# Patient Record
Sex: MALE | Race: WHITE | NOT HISPANIC OR LATINO | Employment: FULL TIME | URBAN - METROPOLITAN AREA
[De-identification: names, ages, dates, MRNs, and addresses within clinical notes are randomized per-mention and may not be internally consistent; named-entity substitution may affect disease eponyms.]

---

## 2020-07-22 ENCOUNTER — OFFICE VISIT - HEALTHEAST (OUTPATIENT)
Dept: FAMILY MEDICINE | Facility: CLINIC | Age: 50
End: 2020-07-22

## 2020-07-22 DIAGNOSIS — Z00.00 ROUTINE GENERAL MEDICAL EXAMINATION AT A HEALTH CARE FACILITY: ICD-10-CM

## 2020-07-22 DIAGNOSIS — E66.811 CLASS 1 OBESITY DUE TO EXCESS CALORIES WITHOUT SERIOUS COMORBIDITY WITH BODY MASS INDEX (BMI) OF 30.0 TO 30.9 IN ADULT: ICD-10-CM

## 2020-07-22 DIAGNOSIS — Z12.11 COLON CANCER SCREENING: ICD-10-CM

## 2020-07-22 DIAGNOSIS — D22.9 BENIGN MOLE: ICD-10-CM

## 2020-07-22 DIAGNOSIS — R03.0 ELEVATED BLOOD PRESSURE READING WITHOUT DIAGNOSIS OF HYPERTENSION: ICD-10-CM

## 2020-07-22 DIAGNOSIS — E66.09 CLASS 1 OBESITY DUE TO EXCESS CALORIES WITHOUT SERIOUS COMORBIDITY WITH BODY MASS INDEX (BMI) OF 30.0 TO 30.9 IN ADULT: ICD-10-CM

## 2020-07-22 ASSESSMENT — MIFFLIN-ST. JEOR: SCORE: 1770.72

## 2020-07-24 ENCOUNTER — AMBULATORY - HEALTHEAST (OUTPATIENT)
Dept: LAB | Facility: CLINIC | Age: 50
End: 2020-07-24

## 2020-07-24 DIAGNOSIS — R03.0 ELEVATED BLOOD PRESSURE READING WITHOUT DIAGNOSIS OF HYPERTENSION: ICD-10-CM

## 2020-07-24 DIAGNOSIS — E66.09 CLASS 1 OBESITY DUE TO EXCESS CALORIES WITHOUT SERIOUS COMORBIDITY WITH BODY MASS INDEX (BMI) OF 30.0 TO 30.9 IN ADULT: ICD-10-CM

## 2020-07-24 DIAGNOSIS — E66.811 CLASS 1 OBESITY DUE TO EXCESS CALORIES WITHOUT SERIOUS COMORBIDITY WITH BODY MASS INDEX (BMI) OF 30.0 TO 30.9 IN ADULT: ICD-10-CM

## 2020-07-24 LAB
ANION GAP SERPL CALCULATED.3IONS-SCNC: 12 MMOL/L (ref 5–18)
BUN SERPL-MCNC: 14 MG/DL (ref 8–22)
CALCIUM SERPL-MCNC: 9.3 MG/DL (ref 8.5–10.5)
CHLORIDE BLD-SCNC: 104 MMOL/L (ref 98–107)
CHOLEST SERPL-MCNC: 199 MG/DL
CO2 SERPL-SCNC: 26 MMOL/L (ref 22–31)
CREAT SERPL-MCNC: 1 MG/DL (ref 0.7–1.3)
ERYTHROCYTE [DISTWIDTH] IN BLOOD BY AUTOMATED COUNT: 12.5 % (ref 11–14.5)
FASTING STATUS PATIENT QL REPORTED: YES
GFR SERPL CREATININE-BSD FRML MDRD: >60 ML/MIN/1.73M2
GLUCOSE BLD-MCNC: 101 MG/DL (ref 70–125)
HBA1C MFR BLD: 5.3 % (ref 3.5–6)
HCT VFR BLD AUTO: 48.3 % (ref 40–54)
HDLC SERPL-MCNC: 49 MG/DL
HGB BLD-MCNC: 15.5 G/DL (ref 14–18)
LDLC SERPL CALC-MCNC: 123 MG/DL
MCH RBC QN AUTO: 29.4 PG (ref 27–34)
MCHC RBC AUTO-ENTMCNC: 32.1 G/DL (ref 32–36)
MCV RBC AUTO: 92 FL (ref 80–100)
PLATELET # BLD AUTO: 279 THOU/UL (ref 140–440)
PMV BLD AUTO: 8.6 FL (ref 8.5–12.5)
POTASSIUM BLD-SCNC: 4.4 MMOL/L (ref 3.5–5)
RBC # BLD AUTO: 5.28 MILL/UL (ref 4.4–6.2)
SODIUM SERPL-SCNC: 142 MMOL/L (ref 136–145)
TRIGL SERPL-MCNC: 136 MG/DL
TSH SERPL DL<=0.005 MIU/L-ACNC: 1.45 UIU/ML (ref 0.3–5)
WBC: 7.9 THOU/UL (ref 4–11)

## 2020-07-26 ENCOUNTER — COMMUNICATION - HEALTHEAST (OUTPATIENT)
Dept: FAMILY MEDICINE | Facility: CLINIC | Age: 50
End: 2020-07-26

## 2020-08-12 ENCOUNTER — RECORDS - HEALTHEAST (OUTPATIENT)
Dept: ADMINISTRATIVE | Facility: OTHER | Age: 50
End: 2020-08-12

## 2021-05-13 ENCOUNTER — OFFICE VISIT - HEALTHEAST (OUTPATIENT)
Dept: FAMILY MEDICINE | Facility: CLINIC | Age: 51
End: 2021-05-13

## 2021-05-13 ENCOUNTER — COMMUNICATION - HEALTHEAST (OUTPATIENT)
Dept: TELEHEALTH | Facility: CLINIC | Age: 51
End: 2021-05-13

## 2021-05-13 DIAGNOSIS — E66.09 CLASS 1 OBESITY DUE TO EXCESS CALORIES WITHOUT SERIOUS COMORBIDITY WITH BODY MASS INDEX (BMI) OF 30.0 TO 30.9 IN ADULT: ICD-10-CM

## 2021-05-13 DIAGNOSIS — D12.6 SERRATED ADENOMA OF COLON: ICD-10-CM

## 2021-05-13 DIAGNOSIS — Z23 ENCOUNTER FOR IMMUNIZATION: ICD-10-CM

## 2021-05-13 DIAGNOSIS — E66.811 CLASS 1 OBESITY DUE TO EXCESS CALORIES WITHOUT SERIOUS COMORBIDITY WITH BODY MASS INDEX (BMI) OF 30.0 TO 30.9 IN ADULT: ICD-10-CM

## 2021-05-13 DIAGNOSIS — R03.0 ELEVATED BLOOD PRESSURE READING WITHOUT DIAGNOSIS OF HYPERTENSION: ICD-10-CM

## 2021-05-13 DIAGNOSIS — M79.672 LEFT FOOT PAIN: ICD-10-CM

## 2021-05-17 ENCOUNTER — OFFICE VISIT - HEALTHEAST (OUTPATIENT)
Dept: PODIATRY | Facility: CLINIC | Age: 51
End: 2021-05-17

## 2021-05-17 DIAGNOSIS — G58.8 NEUROMA DIGITAL NERVE: ICD-10-CM

## 2021-05-17 ASSESSMENT — MIFFLIN-ST. JEOR: SCORE: 1813.8

## 2021-05-27 VITALS — BODY MASS INDEX: 30.06 KG/M2 | HEIGHT: 70 IN | OXYGEN SATURATION: 97 % | HEART RATE: 80 BPM | WEIGHT: 210 LBS

## 2021-05-27 VITALS
DIASTOLIC BLOOD PRESSURE: 88 MMHG | SYSTOLIC BLOOD PRESSURE: 160 MMHG | OXYGEN SATURATION: 98 % | HEART RATE: 82 BPM | WEIGHT: 216 LBS

## 2021-05-29 ENCOUNTER — HEALTH MAINTENANCE LETTER (OUTPATIENT)
Age: 51
End: 2021-05-29

## 2021-06-04 VITALS
SYSTOLIC BLOOD PRESSURE: 150 MMHG | BODY MASS INDEX: 30.21 KG/M2 | WEIGHT: 204 LBS | HEIGHT: 69 IN | DIASTOLIC BLOOD PRESSURE: 88 MMHG | OXYGEN SATURATION: 100 % | HEART RATE: 79 BPM

## 2021-06-09 NOTE — PROGRESS NOTES
Assessment/Plan:     1. Routine general medical examination at a health care facility  Routine healthcare maintenance.  Preventative cares reviewed.  Patient establishing care in this office.  Adacel booster provided.  Shingrix immunization provided and will require booster in 2 to 6 months.  Annual physical exams to continue.  - Tdap vaccine greater than or equal to 6yo IM  - Varicella Zoster, Recombinant Vaccine IM    2. Class 1 obesity due to excess calories without serious comorbidity with body mass index (BMI) of 30.0 to 30.9 in adult  Dietary and exercise modification reviewed for weight goal less than 190 pounds initially, less than 185 pounds ideally.  Diabetes screen and lipid cascade to be obtained.  - Lipid Cascade; Future  - Glycosylated Hemoglobin A1c; Future    3. Elevated blood pressure reading without diagnosis of hypertension  Elevated blood pressure noted 150/88 on recheck and will check base metabolic panel, CBC and TSH.  Weight goal as noted above with blood pressure recheck in office in 6 months to ensure less than 130/80 ideally.  Monitor blood pressures outside of clinic.  - Basic Metabolic Panel; Future  - Thyroid Stimulating Hormone (TSH); Future  - HM2(CBC w/o Differential); Future    4. Colon cancer screening  Screening colonoscopy to be completed at earliest convenience.  No significant risk factors identified.  - Ambulatory referral for Colonoscopy       I have had an Advance Directives discussion with the patient.  The following high BMI interventions were performed this visit: encouragement to exercise, weight monitoring, weight loss from baseline weight and lifestyle education regarding diet.  Ensure ongoing efforts to achieve weight goal < 190 pounds initially, < 185 pounds ideally.              Subjective:      Bernard Norman is a 49 y.o. male who presents for an annual exam.  Establishing care.  Has gained weight with current pandemic.  Less active.  Occasional elevated blood  pressure without history of hypertension.  Past medical social and family history reviewed as noted below with family history of heart disease in patient's mother.  Father also with diabetes apparently.  Patient has not had colonoscopy.  Does not know when he had prior immunizations however feels that it was over 10 years ago.  Denies recent illness.  Comprehensive review of systems as above otherwise all negative.     - Indy bingham   2 sons - Wilfredo (17) and Percy (15)  Tobacco:  never  EtOH:  occ beer or whiskey  Mom -  65 heart disease  Dad -  58 suicide with h/o depression; h/o diabetes  2 older bros -   Surgeries:  Lasik (uses reading glasses now)  Hospitalizations:  none  Work:  engineering at chemical plants  Hobbies:  broomball, board games (Pandemic)    Healthy Habits:   Regular Exercise: Yes  Healthy Diet: Yes  Dental Visits Regularly: Yes  Seat Belt: Yes   Sexually active: Yes  Colonoscopy: due (will schedule)  Lipid Profile: Yes  Glucose Screen: Yes    There is no immunization history for the selected administration types on file for this patient.  Immunization status: agrees to Tdap booster as well as starting Shingrix series.  Vision Screening:both eyes (had Lasik eye surgery)  Hearing: PASS     No current outpatient medications on file.     No current facility-administered medications for this visit.      History reviewed. No pertinent past medical history.  History reviewed. No pertinent surgical history.  Patient has no known allergies.  History reviewed. No pertinent family history.  Social History     Socioeconomic History     Marital status:      Spouse name: Not on file     Number of children: Not on file     Years of education: Not on file     Highest education level: Not on file   Occupational History     Not on file   Social Needs     Financial resource strain: Not on file     Food insecurity     Worry: Not on file     Inability: Not on file     Transportation  "needs     Medical: Not on file     Non-medical: Not on file   Tobacco Use     Smoking status: Never Smoker     Smokeless tobacco: Never Used   Substance and Sexual Activity     Alcohol use: Not on file     Drug use: Not on file     Sexual activity: Not on file   Lifestyle     Physical activity     Days per week: Not on file     Minutes per session: Not on file     Stress: Not on file   Relationships     Social connections     Talks on phone: Not on file     Gets together: Not on file     Attends Islam service: Not on file     Active member of club or organization: Not on file     Attends meetings of clubs or organizations: Not on file     Relationship status: Not on file     Intimate partner violence     Fear of current or ex partner: Not on file     Emotionally abused: Not on file     Physically abused: Not on file     Forced sexual activity: Not on file   Other Topics Concern     Not on file   Social History Narrative     Not on file       Review of Systems  Comprehensive ROS: as above, otherwise all negative.           Objective:     /88   Pulse 79   Ht 5' 9\" (1.753 m)   Wt 204 lb (92.5 kg)   SpO2 100%   BMI 30.13 kg/m    Body mass index is 30.13 kg/m .    Physical    General Appearance:    Alert, cooperative, no distress, appears stated age.  BMI 30.13   Head:    Normocephalic, without obvious abnormality, atraumatic   Eyes:    PERRL, conjunctiva/corneas clear, EOM's intact, fundi     benign, both eyes        Ears:    Normal TM's and external ear canals, both ears   Nose:   Nares normal, septum midline, mucosa normal, no drainage    or sinus tenderness   Throat:   Lips, mucosa, and tongue normal; teeth and gums normal   Neck:   Supple, symmetrical, trachea midline, no adenopathy;        thyroid:  No enlargement/tenderness/nodules; no carotid    bruit or JVD   Back:     Symmetric, no curvature, ROM normal, no CVA tenderness   Lungs:     Clear to auscultation bilaterally, respirations unlabored "   Chest wall:    No tenderness or deformity   Heart:    Regular rate and rhythm, S1 and S2 normal, no murmur, rub   or gallop   Abdomen:     Soft, non-tender, bowel sounds active all four quadrants,     no masses, no organomegaly.     Genitalia:    Normal male without lesion, discharge or tenderness.  No inguinal hernia noted.     Rectal:    Normal tone.  Prostate normal/symmetric, no masses or tenderness.   Extremities:   Extremities normal, atraumatic, no cyanosis or edema   Pulses:   2+ and symmetric all extremities   Skin:   Skin color, texture, turgor normal, no rashes.  Mole noted between second and third toe webspace appearing benign.  Describes unchanged in past 20 years.   Lymph nodes:   Cervical, supraclavicular, and axillary nodes normal   Neurologic:   CNII-XII intact. Normal strength, sensation and reflexes       throughout                This note has been dictated using voice recognition software and as a result may contain minor grammatical errors and unintended word substitutions.

## 2021-06-16 PROBLEM — D12.6 SERRATED ADENOMA OF COLON: Status: ACTIVE | Noted: 2021-05-13

## 2021-06-16 PROBLEM — R03.0 ELEVATED BLOOD PRESSURE READING WITHOUT DIAGNOSIS OF HYPERTENSION: Status: ACTIVE | Noted: 2020-07-22

## 2021-06-16 PROBLEM — D22.9 BENIGN MOLE: Status: ACTIVE | Noted: 2020-07-22

## 2021-06-16 PROBLEM — E66.09 CLASS 1 OBESITY DUE TO EXCESS CALORIES WITHOUT SERIOUS COMORBIDITY WITH BODY MASS INDEX (BMI) OF 30.0 TO 30.9 IN ADULT: Status: ACTIVE | Noted: 2020-07-22

## 2021-06-16 PROBLEM — E66.811 CLASS 1 OBESITY DUE TO EXCESS CALORIES WITHOUT SERIOUS COMORBIDITY WITH BODY MASS INDEX (BMI) OF 30.0 TO 30.9 IN ADULT: Status: ACTIVE | Noted: 2020-07-22

## 2021-06-17 NOTE — PATIENT INSTRUCTIONS - HE
Please call one of the [place] locations below to schedule an appointment. If you received a prescription please bring it with you to your appointment. Some locations are limited to what they carry.    Office Locations    [facility]  [address] [place] [address]  Home Medical Equipment, [address]   Phone: [phone]   Orthotics and Prosthetics, [address]   Phone: [phone]    [facility]  Home Medical Equipment  [address], [place] [address]   Phone: [phone]    Orthotics and Prosthetics ([facility])    [address], [address], [place] [address]  Phone: [phone]    [facility]  [address]   [place] [address]   Phone: [phone]    [facility]ld[facility]  [address]  [place] [address]  Phone: [phone]    [facility]   [address]  [place] [address]  Phone: [phone]    [facility]  [address]  [place] [address]  Phone: [phone]    [facility]  [address]  [address]  [place] [address]  Phone: [phone]    [place]   [address] [place] [address]   Phone: [phone]      What are Prescription Custom Orthotics?  Custom orthotics are specially-made devices designed to support and comfort your feet. Prescription orthotics are crafted for you and no one else. They match the contours of your feet precisely and are designed for the way you move. Orthotics are only manufactured after a podiatrist has conducted a complete evaluation of your feet, ankles, and legs, so the orthotic can accommodate your unique foot structure and pathology.  Prescription orthotics are divided into two  categories:    Functional orthotics are designed to control abnormal motion. They may be used to treat foot pain caused by abnormal motion; they can also be used to treat injuries such as shin splints or tendinitis. Functional orthotics are usually crafted of a semi-rigid material such as plastic or graphite.    Accommodative orthotics are softer and meant to provide additional cushioning and support. They can be used to treat diabetic foot ulcers, painful calluses on the bottom of the foot, and other uncomfortable conditions.  Podiatrists use orthotics to treat foot problems such as plantar fasciitis, bursitis, tendinitis, diabetic foot ulcers, and foot, ankle, and heel pain. Clinical research studies have shown that podiatrist-prescribed foot orthotics decrease foot pain and improve function.  Orthotics typically cost more than shoe inserts purchased in a retail store, but the additional cost is usually well worth it. Unlike shoe inserts, orthotics are molded to fit each individual foot, so you can be sure that your orthotics fit and do what they're supposed to do. Prescription orthotics are also made of top-notch materials and last many years when cared for properly. Insurance often helps pay for prescription orthotics.  What are Shoe Inserts?   You've seen them at the grocery store and at the mall. You've probably even seen them on TV and online. Shoe inserts are any kind of non-prescription foot support designed to be worn inside a shoe. Pre-packaged, mass produced, arch supports are shoe inserts. So are the  custom-made  insoles and foot supports that you can order online or at retail stores. Unless the device has been prescribed by a doctor and crafted for your specific foot, it's a shoe insert, not a custom orthotic device--despite what the ads might say.  Shoe inserts can be very helpful for a variety of foot ailments, including flat arches and foot and leg pain. They can cushion your feet, provide comfort,  and support your arches, but they can't correct biomechanical foot problems or cure long-standing foot issues.  The most common types of shoe inserts are:    Arch supports: Some people have high arches. Others have low arches or flat feet. Arch supports generally have a  bumped-up  appearance and are designed to support the foot's natural arch.     Insoles: Insoles slip into your shoe to provide extra cushioning and support. Insoles are often made of gel, foam, or plastic.     Heel liners: Heel liners, sometimes called heel pads or heel cups, provide extra cushioning in the heel region. They may be especially useful for patients who have foot pain caused by age-related thinning of the heels' natural fat pads.     Foot cushions: Do your shoes rub against your heel or your toes? Foot cushions come in many different shapes and sizes and can be used as a barrier between you and your shoe.  Choosing an Over-the-Counter Shoe Insert  Selecting a shoe insert from the wide variety of devices on the market can be overwhelming. Here are some podiatrist-tested tips to help you find the insert that best meets your needs:    Consider your health. Do you have diabetes? Problems with circulation? An over-the-counter insert may not be your best bet. Diabetes and poor circulation increase your risk of foot ulcers and infections, so schedule an appointment with a podiatrist. He or she can help you select a solution that won't cause additional health problems.     Think about the purpose. Are you planning to run a marathon, or do you just need a little arch support in your work shoes? Look for a product that fits your planned level of activity.     Bring your shoes. For the insert to be effective, it has to fit into your shoes. So bring your sneakers, dress shoes, or work boots--whatever you plan to wear with your insert. Look for an insert that will fit the contours of your shoe.     Try them on. If all possible, slip the insert into  your shoe and try it out. Walk around a little. How does it feel? Don't assume that feelings of pressure will go away with continued wear. (If you can't try the inserts at the store, ask about the store's return policy and hold on to your receipt.)

## 2021-06-17 NOTE — PROGRESS NOTES
Assessment/Plan:    1. Elevated blood pressure reading without diagnosis of hypertension  Elevated blood pressure noted.  Blood pressure 144/90 on recheck.  We will continue to monitor outside of office for goal less than 130/80 ideally.  Weight goal less than 200 pounds initially, less than 190 pounds ideally with anticipated reassessment at physical exam 2021.    2. Class 1 obesity due to excess calories without serious comorbidity with body mass index (BMI) of 30.0 to 30.9 in adult  Weight goal less than 200 pounds initially, less than 190 pounds ideally with anticipated reassessment at physical exam 2021.    3. Serrated adenoma of colon  Serrated adenoma x2 with tubular adenoma on recent colonoscopy 2020 and will repeat at 3-year interval.    4. Encounter for immunization  Shingrix booster provided today.  - Varicella Zoster, Recombinant Vaccine IM    5. Left foot pain  Left foot pain reviewed with positive Estrada's sign with question of Martínez's neuroma and did refer patient to see podiatrist for further treatment options.  - Ambulatory referral to Podiatry - [facility] (includes [facility] groups)          Subjective:    Bernard Norman is seen today for elevated blood pressure follow-up.  Patient had blood pressure elevation at physical exam 2020 with blood pressure 150/88 at that time.  Not checking blood pressures outside of office.  Patient's mother  age 65 secondary to heart disease.  Patient did have colonoscopy 2020 with serrated adenoma x2 along with tubular adenoma and did have recommendation for 3-year follow-up.  Needs Shingrix booster.  Has not had COVID-19.  Did have COVID-19 vaccine series.  Left foot pain also.  Wondering if he overdid it.  Does a lot of mountain biking etc.  Heavy activity will cause pain.  Noticed some separation between second and third toes of left foot.  No history of Martínez's neuroma.  No right foot  concerns.  Does have some numbness perhaps into second or third toe of foot as well at times, intermittent.  Comprehensive review of systems as above otherwise all negative.     - Indy bingham 1994   2 sons - Wilfredo (17) and Percy (15)   Tobacco:  never   EtOH:  occ beer or whiskey   Mom -  65 heart disease   Dad -  58 suicide with h/o depression; h/o diabetes   2 older bros -   Surgeries:  Lasik (uses reading glasses now)   Hospitalizations:  none   Work:  engineering at chemical plants   Hobbies:  broomball, board games (Pandemic)    History reviewed. No pertinent surgical history.     History reviewed. No pertinent family history.     History reviewed. No pertinent past medical history.     Social History     Tobacco Use     Smoking status: Never Smoker     Smokeless tobacco: Never Used   Substance Use Topics     Alcohol use: Not on file     Drug use: Not on file        No current outpatient medications on file.     No current facility-administered medications for this visit.           Objective:    Vitals:    21 1534   BP: 160/88   Pulse: 82   SpO2: 98%   Weight: 216 lb (98 kg)      Body mass index is 31.9 kg/m .    Alert.  No apparent distress.  Blood pressure on recheck 144/90 with BMI 31.9.  Musculoskeletal exam with some splaying of toes between second and third left foot with tenderness in interspace at metatarsal head.      This note has been dictated using voice recognition software and as a result may contain minor grammatical errors and unintended word substitutions.

## 2021-06-17 NOTE — PROGRESS NOTES
FOOT AND ANKLE SURGERY/PODIATRY CONSULT NOTE         ASSESSMENT:   Neuroma second intermetatarsal space left foot      TREATMENT:  I have recommended orthotics.  The patient is to return to the clinic if his pain progresses.        HPI: I was asked to see Bernard Norman today complaining of some numbness and some mild pain between the second and third toes of the left foot.  The patient stated that today his symptoms are pretty mild.  He also notices numbness between the first and second toes of his left foot.  He denies any trauma to his foot.  He has not had any redness, swelling, drainage or bleeding.  He denies any previous treatment.  The pain is nonradiating.  The patient was seen in consultation at the request of Jamie Fabian MD for evaluation and treatment of left foot pain.     No past medical history on file.    No past surgical history on file.    No Known Allergies    No current outpatient medications on file.    No family history on file.    Social History     Socioeconomic History     Marital status:      Spouse name: Not on file     Number of children: Not on file     Years of education: Not on file     Highest education level: Not on file   Occupational History     Not on file   Social Needs     Financial resource strain: Not on file     Food insecurity     Worry: Not on file     Inability: Not on file     Transportation needs     Medical: Not on file     Non-medical: Not on file   Tobacco Use     Smoking status: Never Smoker     Smokeless tobacco: Never Used   Substance and Sexual Activity     Alcohol use: Not on file     Drug use: Not on file     Sexual activity: Not on file   Lifestyle     Physical activity     Days per week: Not on file     Minutes per session: Not on file     Stress: Not on file   Relationships     Social connections     Talks on phone: Not on file     Gets together: Not on file     Attends Hoahaoism service: Not on file     Active member of club or organization: Not on  file     Attends meetings of clubs or organizations: Not on file     Relationship status: Not on file     Intimate partner violence     Fear of current or ex partner: Not on file     Emotionally abused: Not on file     Physically abused: Not on file     Forced sexual activity: Not on file   Other Topics Concern     Not on file   Social History Narrative     Not on file       Review of Systems - Patient denies fever, chills, rash, wound, stiffness, limping, numbness, weakness, heart burn, blood in stool, chest pain with activity, calf pain when walking, shortness of breath with activity, chronic cough, easy bleeding/bruising, swelling of ankles, excessive thirst, fatigue, depression, anxiety.  Patient admits to left foot pain.      OBJECTIVE:  Appearance: alert, well appearing, and in no distress.    Vitals:    05/17/21 1535   Pulse: 80   SpO2: 97%       BMI= Body mass index is 30.13 kg/m .    General appearance: Patient is alert and fully cooperative with history & exam.  No sign of distress is noted during the visit.  Psychiatric: Affect is pleasant & appropriate.  Patient appears motivated to improve health.  Respiratory: Breathing is regular & unlabored while sitting.  HEENT: Hearing is intact to spoken word.  Speech is clear.  No gross evidence of visual impairment that would impact ambulation.    Vascular: Dorsalis pedis and posterior tibial pulses are palpable. There is pedal hair growth bilaterally.  CFT < 3 sec from anterior tibial surface to distal digits bilaterally. There is no appreciable edema noted.  Dermatologic: Turgor and texture are within normal limits. No coloration or temperature changes. No primary or secondary lesions noted.  Neurologic: All epicritic and proprioceptive sensations are grossly intact bilaterally.  Negative Anastasiia sign noted second intermetatarsal space left foot  Musculoskeletal: All active and passive ankle, subtalar, midtarsal, and 1st MPJ range of motion are grossly intact  without pain or crepitus, with the exception of none. Manual muscle strength is within normal limits bilaterally. All dorsiflexors, plantarflexors, invertors, evertors are intact bilaterally. Tenderness present to second intermetatarsal space left foot on palpation.  No tenderness to the left foot or ankle with range of motion. Calf is soft/non-tender without warmth/induration    Imaging:         No results found.       Emre Farrell; JOSEFA  [facility] Foot & Ankle Surgery/Podiatry

## 2021-06-20 NOTE — LETTER
Letter by Jamie Fabian MD at      Author: Jamie Fabian MD Service: -- Author Type: --    Filed:  Encounter Date: 7/26/2020 Status: (Other)         Bernard Norman  [address] [facility] [place] [address]             July 26, 2020         Dear Mr. Norman,    Below are the results from your recent visit:    Resulted Orders   Lipid Cascade   Result Value Ref Range    Cholesterol 199 <=199 mg/dL    Triglycerides 136 <=149 mg/dL    HDL Cholesterol 49 >=40 mg/dL    LDL Calculated 123 <=129 mg/dL    Patient Fasting > 8hrs? Yes    Basic Metabolic Panel   Result Value Ref Range    Sodium 142 136 - 145 mmol/L    Potassium 4.4 3.5 - 5.0 mmol/L    Chloride 104 98 - 107 mmol/L    CO2 26 22 - 31 mmol/L    Anion Gap, Calculation 12 5 - 18 mmol/L    Glucose 101 70 - 125 mg/dL    Calcium 9.3 8.5 - 10.5 mg/dL    BUN 14 8 - 22 mg/dL    Creatinine 1.00 0.70 - 1.30 mg/dL    GFR MDRD Af Amer >60 >60 mL/min/1.73m2    GFR MDRD Non Af Amer >60 >60 mL/min/1.73m2    Narrative    Fasting Glucose reference range is 70-99 mg/dL per  American Diabetes Association (ADA) guidelines.   Thyroid Stimulating Hormone (TSH)   Result Value Ref Range    TSH 1.45 0.30 - 5.00 uIU/mL   HM2(CBC w/o Differential)   Result Value Ref Range    WBC 7.9 4.0 - 11.0 thou/uL    RBC 5.28 4.40 - 6.20 mill/uL    Hemoglobin 15.5 14.0 - 18.0 g/dL    Hematocrit 48.3 40.0 - 54.0 %    MCV 92 80 - 100 fL    MCH 29.4 27.0 - 34.0 pg    MCHC 32.1 32.0 - 36.0 g/dL    RDW 12.5 11.0 - 14.5 %    Platelets 279 140 - 440 thou/uL    MPV 8.6 8.5 - 12.5 fL   Glycosylated Hemoglobin A1c   Result Value Ref Range    Hemoglobin A1c 5.3 3.5 - 6.0 %       Your cholesterol results are at goal.     Your kidney function tests (i.e. Basic Metabolic Panel) were normal.     Your thyroid screening test (i.e. TSH) was normal.     Your complete blood count results were normal.  No evidence for anemia, etc.     No evidence for diabetes noted.     Please call with questions or contact us using  [employer].    Sincerely,        Electronically signed by Jamie Fabian MD

## 2021-09-18 ENCOUNTER — HEALTH MAINTENANCE LETTER (OUTPATIENT)
Age: 51
End: 2021-09-18

## 2022-11-19 ENCOUNTER — HEALTH MAINTENANCE LETTER (OUTPATIENT)
Age: 52
End: 2022-11-19

## 2024-01-28 ENCOUNTER — HEALTH MAINTENANCE LETTER (OUTPATIENT)
Age: 54
End: 2024-01-28

## 2024-07-24 ENCOUNTER — TRANSFERRED RECORDS (OUTPATIENT)
Dept: HEALTH INFORMATION MANAGEMENT | Facility: CLINIC | Age: 54
End: 2024-07-24
Payer: COMMERCIAL

## 2024-10-14 ENCOUNTER — OFFICE VISIT (OUTPATIENT)
Dept: FAMILY MEDICINE | Facility: CLINIC | Age: 54
End: 2024-10-14
Payer: COMMERCIAL

## 2024-10-14 VITALS
TEMPERATURE: 97.4 F | OXYGEN SATURATION: 100 % | DIASTOLIC BLOOD PRESSURE: 97 MMHG | HEART RATE: 83 BPM | HEIGHT: 69 IN | RESPIRATION RATE: 14 BRPM | BODY MASS INDEX: 29.83 KG/M2 | SYSTOLIC BLOOD PRESSURE: 175 MMHG | WEIGHT: 201.4 LBS

## 2024-10-14 DIAGNOSIS — Z00.00 ROUTINE GENERAL MEDICAL EXAMINATION AT A HEALTH CARE FACILITY: Primary | ICD-10-CM

## 2024-10-14 DIAGNOSIS — I10 BENIGN ESSENTIAL HYPERTENSION: ICD-10-CM

## 2024-10-14 LAB
ERYTHROCYTE [DISTWIDTH] IN BLOOD BY AUTOMATED COUNT: 12.2 % (ref 10–15)
HCT VFR BLD AUTO: 45 % (ref 40–53)
HGB BLD-MCNC: 15.8 G/DL (ref 13.3–17.7)
MCH RBC QN AUTO: 30.3 PG (ref 26.5–33)
MCHC RBC AUTO-ENTMCNC: 35.1 G/DL (ref 31.5–36.5)
MCV RBC AUTO: 86 FL (ref 78–100)
PLATELET # BLD AUTO: 264 10E3/UL (ref 150–450)
RBC # BLD AUTO: 5.22 10E6/UL (ref 4.4–5.9)
WBC # BLD AUTO: 6.9 10E3/UL (ref 4–11)

## 2024-10-14 PROCEDURE — 80061 LIPID PANEL: CPT

## 2024-10-14 PROCEDURE — 36415 COLL VENOUS BLD VENIPUNCTURE: CPT

## 2024-10-14 PROCEDURE — 90480 ADMN SARSCOV2 VAC 1/ONLY CMP: CPT

## 2024-10-14 PROCEDURE — G0103 PSA SCREENING: HCPCS

## 2024-10-14 PROCEDURE — 90471 IMMUNIZATION ADMIN: CPT

## 2024-10-14 PROCEDURE — 90673 RIV3 VACCINE NO PRESERV IM: CPT

## 2024-10-14 PROCEDURE — 99213 OFFICE O/P EST LOW 20 MIN: CPT | Mod: 25

## 2024-10-14 PROCEDURE — 99386 PREV VISIT NEW AGE 40-64: CPT | Mod: 25

## 2024-10-14 PROCEDURE — 91320 SARSCV2 VAC 30MCG TRS-SUC IM: CPT

## 2024-10-14 PROCEDURE — 80048 BASIC METABOLIC PNL TOTAL CA: CPT

## 2024-10-14 PROCEDURE — 85027 COMPLETE CBC AUTOMATED: CPT

## 2024-10-14 RX ORDER — LOSARTAN POTASSIUM 50 MG/1
50 TABLET ORAL DAILY
Qty: 30 TABLET | Refills: 1 | Status: SHIPPED | OUTPATIENT
Start: 2024-10-14 | End: 2024-11-08

## 2024-10-14 SDOH — HEALTH STABILITY: PHYSICAL HEALTH: ON AVERAGE, HOW MANY DAYS PER WEEK DO YOU ENGAGE IN MODERATE TO STRENUOUS EXERCISE (LIKE A BRISK WALK)?: 3 DAYS

## 2024-10-14 SDOH — HEALTH STABILITY: PHYSICAL HEALTH: ON AVERAGE, HOW MANY MINUTES DO YOU ENGAGE IN EXERCISE AT THIS LEVEL?: 40 MIN

## 2024-10-14 ASSESSMENT — PAIN SCALES - GENERAL: PAINLEVEL: NO PAIN (0)

## 2024-10-14 ASSESSMENT — SOCIAL DETERMINANTS OF HEALTH (SDOH): HOW OFTEN DO YOU GET TOGETHER WITH FRIENDS OR RELATIVES?: MORE THAN THREE TIMES A WEEK

## 2024-10-14 NOTE — PATIENT INSTRUCTIONS
Patient Education   Preventive Care Advice   This is general advice given by our system to help you stay healthy. However, your care team may have specific advice just for you. Please talk to your care team about your preventive care needs.  Nutrition  Eat 5 or more servings of fruits and vegetables each day.  Try wheat bread, brown rice and whole grain pasta (instead of white bread, rice, and pasta).  Get enough calcium and vitamin D. Check the label on foods and aim for 100% of the RDA (recommended daily allowance).  Lifestyle  Exercise at least 150 minutes each week  (30 minutes a day, 5 days a week).  Do muscle strengthening activities 2 days a week. These help control your weight and prevent disease.  No smoking.  Wear sunscreen to prevent skin cancer.  Have a dental exam and cleaning every 6 months.  Yearly exams  See your health care team every year to talk about:  Any changes in your health.  Any medicines your care team has prescribed.  Preventive care, family planning, and ways to prevent chronic diseases.  Shots (vaccines)   HPV shots (up to age 26), if you've never had them before.  Hepatitis B shots (up to age 59), if you've never had them before.  COVID-19 shot: Get this shot when it's due.  Flu shot: Get a flu shot every year.  Tetanus shot: Get a tetanus shot every 10 years.  Pneumococcal, hepatitis A, and RSV shots: Ask your care team if you need these based on your risk.  Shingles shot (for age 50 and up)  General health tests  Diabetes screening:  Starting at age 35, Get screened for diabetes at least every 3 years.  If you are younger than age 35, ask your care team if you should be screened for diabetes.  Cholesterol test: At age 39, start having a cholesterol test every 5 years, or more often if advised.  Bone density scan (DEXA): At age 50, ask your care team if you should have this scan for osteoporosis (brittle bones).  Hepatitis C: Get tested at least once in your life.  STIs (sexually  transmitted infections)  Before age 24: Ask your care team if you should be screened for STIs.  After age 24: Get screened for STIs if you're at risk. You are at risk for STIs (including HIV) if:  You are sexually active with more than one person.  You don't use condoms every time.  You or a partner was diagnosed with a sexually transmitted infection.  If you are at risk for HIV, ask about PrEP medicine to prevent HIV.  Get tested for HIV at least once in your life, whether you are at risk for HIV or not.  Cancer screening tests  Cervical cancer screening: If you have a cervix, begin getting regular cervical cancer screening tests starting at age 21.  Breast cancer scan (mammogram): If you've ever had breasts, begin having regular mammograms starting at age 40. This is a scan to check for breast cancer.  Colon cancer screening: It is important to start screening for colon cancer at age 45.  Have a colonoscopy test every 10 years (or more often if you're at risk) Or, ask your provider about stool tests like a FIT test every year or Cologuard test every 3 years.  To learn more about your testing options, visit:   .  For help making a decision, visit:   https://bit.ly/ve84255.  Prostate cancer screening test: If you have a prostate, ask your care team if a prostate cancer screening test (PSA) at age 55 is right for you.  Lung cancer screening: If you are a current or former smoker ages 50 to 80, ask your care team if ongoing lung cancer screenings are right for you.  For informational purposes only. Not to replace the advice of your health care provider. Copyright   2023 [facility]. All rights reserved. Clinically reviewed by the [facility]. FNZ [id number] - REV 01/24.  9 Ways to Cut Back on Drinking  Maybe you've found yourself drinking more alcohol than you'd prefer. If you want to cut back, here are some ideas to try.    Think before you drink.  Do you really want a drink,  "or is it just a habit? If you're used to having a drink at a certain time, try doing something else then.     Look for substitutes.  Find some no-alcohol drinks that you enjoy, like flavored seltzer water, tea with honey, or tonic with a slice of lime. Or try alcohol-free beer or \"virgin\" cocktails (without the alcohol).     Drink more water.  Use water to quench your thirst. Drink a glass of water before you have any alcohol. Have another glass along with every drink or between drinks.     Shrink your drink.  For example, have a bottle of beer instead of a pint. Use a smaller glass for wine. Choose drinks with lower alcohol content (ABV%). Or use less liquor and more mixer in cocktails.     Slow down.  It's easy to drink quickly and without thinking about it. Pay attention, and make each drink last longer.     Do the math.  Total up how much you spend on alcohol each month. How much is that a year? If you cut back, what could you do with the money you save?     Take a break.  Choose a day or two each week when you won't drink at all. Notice how you feel on those days, physically and emotionally. How did you sleep? Do you feel better? Over time, add more break days.     Count calories.  Would you like to lose some weight? For some people that's a good motivator for cutting back. Figure out how many calories are in each drink. How many does that add up to in a day? In a week? In a month?     Practice saying no.  Be ready when someone offers you a drink. Try: \"Thanks, I've had enough.\" Or \"Thanks, but I'm cutting back.\" Or \"No, thanks. I feel better when I drink less.\"   Current as of: November 15, 2023  Content Version: 14.2 2024 [employer].   Care instructions adapted under license by your healthcare professional. If you have questions about a medical condition or this instruction, always ask your healthcare professional. [employer] disclaims any warranty or liability for your use of " this information.

## 2024-10-14 NOTE — PROGRESS NOTES
"Preventive Care Visit  [facility]  DWAYNE Cee CNP, Family Medicine  Oct 14, 2024      Assessment & Plan     Routine general medical examination at a health care facility  We reviewed age-appropriate screening and immunizations.  Immunizations were updated today.  Colonoscopy is up-to-date.  Will obtain PSA, we had discussion over shared decision making for prostate cancer screening.  Will check cholesterol, kidney function, screen for diabetes today.  Follow-up in 1 month for blood pressure check  - Lipid panel reflex to direct LDL Non-fasting; Future  - PSA, screen; Future     Benign essential hypertension  This is a new diagnosis today for the patient.  We discussed the importance of controlling blood pressure to prevent heart attack, stroke, heart failure, kidney problems.  We discussed lifestyle changes, and I provided information on this.  Given how elevated his blood pressure is today after multiple readings and previous elevated blood pressure readings I think the blood pressure should be treated with medication.  Plan to start losartan 50 mg and follow-up in 1 month to recheck blood pressure and labs.  - Basic metabolic panel  (Ca, Cl, CO2, Creat, Gluc, K, Na, BUN); Future  - losartan (COZAAR) 50 MG tablet; Take 1 tablet (50 mg) by mouth daily.    Patient has been advised of split billing requirements and indicates understanding: Yes        BMI  Estimated body mass index is 30.17 kg/m  as calculated from the following:    Height as of this encounter: 1.74 m (5' 8.5\").    Weight as of this encounter: 91.4 kg (201 lb 6.4 oz).   Weight management plan: Discussed healthy diet and exercise guidelines    Counseling  Appropriate preventive services were addressed with this patient via screening, questionnaire, or discussion as appropriate for fall prevention, nutrition, physical activity, Tobacco-use cessation, social engagement, weight loss and cognition.  Checklist " reviewing preventive services available has been given to the patient.  Reviewed patient's diet, addressing concerns and/or questions.   He is at risk for lack of exercise and has been provided with information to increase physical activity for the benefit of his well-being.   The patient was instructed to see the dentist every 6 months.   The patient reports drinking more than 3 alcoholic drinks per day and/or more than 7 drhnks per week. The patient was counseled and given information about possible harmful effects of excessive alcohol intake.        Kandice Wagner is a 53 year old, presenting for the following:  Physical (Blood pressure concerns)        10/14/2024     3:18 PM   Additional Questions   Roomed by [facility] Care Directive  Patient does not have a Health Care Directive or Living Will: Discussed advance care planning with patient; information given to patient to review.    HPI  Here for annual wellness exam.  It has been a number of years that he has been to see a provider, he has had some job changes and insurance lapses.    He is up-to-date on colonoscopy.  He has a history of polyps being removed.  He does have positive family history for colon cancer his paternal grandfather had cancer.  His last colonoscopy was this past summer.  No dysplasia from polyps.  He can repeat in 5 years    Elevated blood pressure  He denies vision changes, headaches.  He has been told he has high blood pressure.  Was told to implement lifestyle changes a few years ago.  His work gave him a blood pressure cuff.  He has had difficulty getting good readings on it.  He travels a lot for work which causes him to eat and drink more than heat like.  He is working on reducing his alcohol intake  No family history of hypertension    Denies chest pain, shortness of breath, abdominal pain, urination problems.    Enjoys canoeing.        10/14/2024   General Health   How would you rate your overall physical health?  Good   Feel stress (tense, anxious, or unable to sleep) To some extent      (!) STRESS CONCERN      10/14/2024   Nutrition   Three or more servings of calcium each day? (!) I DON'T KNOW   Diet: Regular (no restrictions)   How many servings of fruit and vegetables per day? (!) 2-3   How many sweetened beverages each day? 0-1            10/14/2024   Exercise   Days per week of moderate/strenous exercise 3 days   Average minutes spent exercising at this level 40 min            10/14/2024   Social Factors   Frequency of gathering with friends or relatives More than three times a week   Worry food won't last until get money to buy more No   Food not last or not have enough money for food? No   Do you have housing? (Housing is defined as stable permanent housing and does not include staying ouside in a car, in a tent, in an abandoned building, in an overnight shelter, or couch-surfing.) Yes   Are you worried about losing your housing? No   Lack of transportation? No   Unable to get utilities (heat,electricity)? No            10/14/2024   Fall Risk   Fallen 2 or more times in the past year? No   Trouble with walking or balance? No             10/14/2024   Dental   Dentist two times every year? (!) NO            10/14/2024   TB Screening   Were you born outside of the [place]? No            Today's PHQ-2 Score:       10/14/2024    10:28 AM   PHQ-2 ( 1999 [employer])   Q1: Little interest or pleasure in doing things 0   Q2: Feeling down, depressed or hopeless 0   PHQ-2 Score 0   Q1: Little interest or pleasure in doing things Not at all   Q2: Feeling down, depressed or hopeless Not at all   PHQ-2 Score 0           10/14/2024   Substance Use   Alcohol more than 3/day or more than 7/wk Yes   How often do you have a drink containing alcohol 4 or more times a week   How many alcohol drinks on typical day 1 or 2   How often do you have 5+ drinks at one occasion Monthly   Audit 2/3 Score 2   How often not able to stop drinking once started  "Never   How often failed to do what normally expected Never   How often needed first drink in am after a heavy drinking session Never   How often feeling of guilt or remorse after drinking Less than monthly   How often unable to remember what happened the night before Never   Have you or someone else been injured because of your drinking No   Has anyone been concerned or suggested you cut down on drinking No   TOTAL SCORE - AUDIT 7   Do you use any other substances recreationally? No        Social History     Tobacco Use    Smoking status: Never    Smokeless tobacco: Never             10/14/2024   One time HIV Screening   Previous HIV test? No          10/14/2024   STI Screening   New sexual partner(s) since last STI/HIV test? No      ASCVD Risk   The ASCVD Risk score (Kevin ESPAÑA, et al., 2019) failed to calculate for the following reasons:    The systolic blood pressure is missing    The BP treatment status is invalid         Reviewed and updated as needed this visit by Provider                    Past Medical History:   Diagnosis Date    Hypertension 2020    Noted during last visit.         Review of Systems  Constitutional, neuro, ENT, endocrine, pulmonary, cardiac, gastrointestinal, genitourinary, musculoskeletal, integument and psychiatric systems are negative, except as otherwise noted.     Objective    Exam  There were no vitals taken for this visit.   Estimated body mass index is 30.13 kg/m  as calculated from the following:    Height as of 5/17/21: 1.778 m (5' 10\").    Weight as of 5/17/21: 95.3 kg (210 lb).    Physical Exam  GENERAL: alert and no distress  EYES: Eyes grossly normal to inspection, PERRL and conjunctivae and sclerae normal  HENT: ear canals and TM's normal, and mouth without ulcers or lesions  NECK: no adenopathy, no asymmetry, masses, or scars  RESP: lungs clear to auscultation - no rales, rhonchi or wheezes  CV: regular rate and rhythm, normal S1 S2, no S3 or S4, no murmur, click " or rub, no peripheral edema  ABDOMEN: soft, nontender, no hepatosplenomegaly, no masses and bowel sounds normal  MS: no gross musculoskeletal defects noted, no edema  SKIN: no suspicious lesions or rashes  NEURO: Normal strength and tone, mentation intact and speech normal  PSYCH: mentation appears normal, affect normal/bright      Signed Electronically by: DWAYNE Cee CNP

## 2024-10-15 ENCOUNTER — PATIENT OUTREACH (OUTPATIENT)
Dept: GASTROENTEROLOGY | Facility: CLINIC | Age: 54
End: 2024-10-15
Payer: COMMERCIAL

## 2024-10-15 LAB
ANION GAP SERPL CALCULATED.3IONS-SCNC: 13 MMOL/L (ref 7–15)
BUN SERPL-MCNC: 16.2 MG/DL (ref 6–20)
CALCIUM SERPL-MCNC: 9.5 MG/DL (ref 8.8–10.4)
CHLORIDE SERPL-SCNC: 100 MMOL/L (ref 98–107)
CHOLEST SERPL-MCNC: 195 MG/DL
CREAT SERPL-MCNC: 0.89 MG/DL (ref 0.67–1.17)
EGFRCR SERPLBLD CKD-EPI 2021: >90 ML/MIN/1.73M2
FASTING STATUS PATIENT QL REPORTED: NO
FASTING STATUS PATIENT QL REPORTED: NO
GLUCOSE SERPL-MCNC: 99 MG/DL (ref 70–99)
HCO3 SERPL-SCNC: 25 MMOL/L (ref 22–29)
HDLC SERPL-MCNC: 43 MG/DL
LDLC SERPL CALC-MCNC: 126 MG/DL
NONHDLC SERPL-MCNC: 152 MG/DL
POTASSIUM SERPL-SCNC: 3.9 MMOL/L (ref 3.4–5.3)
PSA SERPL DL<=0.01 NG/ML-MCNC: 1.12 NG/ML (ref 0–3.5)
SODIUM SERPL-SCNC: 138 MMOL/L (ref 135–145)
TRIGL SERPL-MCNC: 129 MG/DL

## 2024-11-08 ENCOUNTER — OFFICE VISIT (OUTPATIENT)
Dept: FAMILY MEDICINE | Facility: CLINIC | Age: 54
End: 2024-11-08
Payer: COMMERCIAL

## 2024-11-08 VITALS
OXYGEN SATURATION: 98 % | HEIGHT: 69 IN | RESPIRATION RATE: 16 BRPM | HEART RATE: 77 BPM | BODY MASS INDEX: 30.45 KG/M2 | WEIGHT: 205.6 LBS | DIASTOLIC BLOOD PRESSURE: 88 MMHG | SYSTOLIC BLOOD PRESSURE: 156 MMHG

## 2024-11-08 DIAGNOSIS — I10 BENIGN ESSENTIAL HYPERTENSION: ICD-10-CM

## 2024-11-08 PROCEDURE — 80048 BASIC METABOLIC PNL TOTAL CA: CPT

## 2024-11-08 PROCEDURE — G2211 COMPLEX E/M VISIT ADD ON: HCPCS

## 2024-11-08 PROCEDURE — 99213 OFFICE O/P EST LOW 20 MIN: CPT

## 2024-11-08 PROCEDURE — 36415 COLL VENOUS BLD VENIPUNCTURE: CPT

## 2024-11-08 RX ORDER — LOSARTAN POTASSIUM 50 MG/1
50 TABLET ORAL DAILY
Qty: 30 TABLET | Refills: 3 | Status: SHIPPED | OUTPATIENT
Start: 2024-11-08

## 2024-11-08 RX ORDER — AMLODIPINE BESYLATE 5 MG/1
5 TABLET ORAL DAILY
Qty: 30 TABLET | Refills: 3 | Status: SHIPPED | OUTPATIENT
Start: 2024-11-08

## 2024-11-08 NOTE — PROGRESS NOTES
Assessment & Plan     Benign essential hypertension  Chronic, not controlled. I am pleased with the improvement from 170s SBP to 150s SBP in less than 4 weeks. Will add in amlodipine for better control and dual coverage. Will have patient check BP at home and let me know if Bps remain > 150. Follow up in 3 months for blood pressure check.   - losartan (COZAAR) 50 MG tablet; Take 1 tablet (50 mg) by mouth daily.  - amLODIPine (NORVASC) 5 MG tablet; Take 1 tablet (5 mg) by mouth daily.  - Basic metabolic panel  (Ca, Cl, CO2, Creat, Gluc, K, Na, BUN); Future  - Basic metabolic panel  (Ca, Cl, CO2, Creat, Gluc, K, Na, BUN)    The longitudinal plan of care for the diagnosis(es)/condition(s) as documented were addressed during this visit. Due to the added complexity in care, I will continue to support Bernard in the subsequent management and with ongoing continuity of care.    Kandice Wagner is a 53 year old, presenting for the following health issues:  Follow Up (Blood pressure)      11/8/2024     3:36 PM   Additional Questions   Roomed by ELIAZAR Song   Accompanied by alone     History of Present Illness       Hypertension: He presents for follow up of hypertension.  He does check blood pressure  regularly outside of the clinic. Outside blood pressures have been over 140/90. He does not follow a low salt diet.     He eats 2-3 servings of fruits and vegetables daily.He consumes 0 sweetened beverage(s) daily.He exercises with enough effort to increase his heart rate 10 to 19 minutes per day.  He exercises with enough effort to increase his heart rate 4 days per week.   He is taking medications regularly.       No side effects    Checks BP at home   They are better, but still elevated > 160 SBP most of the time. Occasional 150s and 140.   No lightheadedness, chest pain, heart palpitations.     Takes medication most days, has forgotten maybe once or twice and occasionally forgets to take it before work    Has been less  "active the last few weeks since it hasn't been as nice outside      Review of Systems  Detailed as above      Objective    BP (!) 160/94   Pulse 77   Resp 16   Ht 1.74 m (5' 8.5\")   Wt 93.3 kg (205 lb 9.6 oz)   SpO2 98%   BMI 30.81 kg/m    Body mass index is 30.81 kg/m .  Physical Exam   GENERAL: alert and no distress  NECK: no adenopathy, no asymmetry, masses, or scars  RESP: lungs clear to auscultation - no rales, rhonchi or wheezes  CV: regular rate and rhythm, normal S1 S2, no S3 or S4, no murmur, click or rub, no peripheral edema  MS: no gross musculoskeletal defects noted, no edema            Signed Electronically by: DWAYNE Cee CNP    "

## 2024-11-09 LAB
ANION GAP SERPL CALCULATED.3IONS-SCNC: 12 MMOL/L (ref 7–15)
BUN SERPL-MCNC: 19.3 MG/DL (ref 6–20)
CALCIUM SERPL-MCNC: 9.2 MG/DL (ref 8.8–10.4)
CHLORIDE SERPL-SCNC: 100 MMOL/L (ref 98–107)
CREAT SERPL-MCNC: 1.63 MG/DL (ref 0.67–1.17)
EGFRCR SERPLBLD CKD-EPI 2021: 50 ML/MIN/1.73M2
GLUCOSE SERPL-MCNC: 109 MG/DL (ref 70–99)
HCO3 SERPL-SCNC: 28 MMOL/L (ref 22–29)
POTASSIUM SERPL-SCNC: 4.4 MMOL/L (ref 3.4–5.3)
SODIUM SERPL-SCNC: 140 MMOL/L (ref 135–145)

## 2025-02-28 ENCOUNTER — MYC REFILL (OUTPATIENT)
Dept: FAMILY MEDICINE | Facility: CLINIC | Age: 55
End: 2025-02-28
Payer: COMMERCIAL

## 2025-02-28 DIAGNOSIS — I10 BENIGN ESSENTIAL HYPERTENSION: ICD-10-CM

## 2025-03-03 DIAGNOSIS — I10 BENIGN ESSENTIAL HYPERTENSION: Primary | ICD-10-CM

## 2025-03-03 RX ORDER — LOSARTAN POTASSIUM 50 MG/1
50 TABLET ORAL DAILY
Qty: 30 TABLET | Refills: 3 | Status: SHIPPED | OUTPATIENT
Start: 2025-03-03

## 2025-03-03 RX ORDER — AMLODIPINE BESYLATE 5 MG/1
5 TABLET ORAL DAILY
Qty: 30 TABLET | Refills: 3 | Status: SHIPPED | OUTPATIENT
Start: 2025-03-03

## 2025-05-16 ENCOUNTER — MYC MEDICAL ADVICE (OUTPATIENT)
Dept: FAMILY MEDICINE | Facility: CLINIC | Age: 55
End: 2025-05-16

## 2025-05-16 NOTE — LETTER
June 30, 2025      Bernard Norman  [id number] [facility] [address]        Hello    We are reaching out because according to our records, your last recorded blood pressure is above your provider's recommended guidelines.  Managing you blood pressure is important to us and we want to give you the care you need.      Please choose one of the three options below:    Take your blood pressure with an automatic machine and call us at [phone] or send us a Edutor message to report the results.  Go to any participating [facility] pharmacy to get your blood pressure checked.    Call [facility] at [phone] to schedule a free nurse only blood pressure visit.     If you have had your blood pressure taken outside the [facility] system, please provide us with that information so it can be documented in your chart.      Thank you for choosing us as a partner in health care.    [facility] Care Team

## 2025-06-30 ENCOUNTER — MYC MEDICAL ADVICE (OUTPATIENT)
Dept: FAMILY MEDICINE | Facility: CLINIC | Age: 55
End: 2025-06-30

## 2025-06-30 ENCOUNTER — MYC REFILL (OUTPATIENT)
Dept: FAMILY MEDICINE | Facility: CLINIC | Age: 55
End: 2025-06-30

## 2025-06-30 VITALS — DIASTOLIC BLOOD PRESSURE: 71 MMHG | SYSTOLIC BLOOD PRESSURE: 132 MMHG

## 2025-06-30 DIAGNOSIS — I10 BENIGN ESSENTIAL HYPERTENSION: ICD-10-CM

## 2025-06-30 RX ORDER — AMLODIPINE BESYLATE 5 MG/1
5 TABLET ORAL DAILY
Qty: 30 TABLET | Refills: 3 | Status: SHIPPED | OUTPATIENT
Start: 2025-06-30

## 2025-06-30 RX ORDER — LOSARTAN POTASSIUM 50 MG/1
50 TABLET ORAL DAILY
Qty: 30 TABLET | Refills: 3 | Status: SHIPPED | OUTPATIENT
Start: 2025-06-30

## 2025-06-30 NOTE — TELEPHONE ENCOUNTER
Patient reported BP entered into flowsheet.     Lai Connolly RN  [facility]

## 2025-06-30 NOTE — TELEPHONE ENCOUNTER
Patient Quality Outreach    Patient is due for the following:   Hypertension -  BP check    Action(s) Taken:   Schedule a nurse only visit for MA BP CHECK    Type of outreach:    Sent Dragon Tailt message. and Sent letter.    Questions for provider review:    None         Meera Ramirez RN  Chart routed to None.